# Patient Record
Sex: FEMALE | Race: WHITE | NOT HISPANIC OR LATINO
[De-identification: names, ages, dates, MRNs, and addresses within clinical notes are randomized per-mention and may not be internally consistent; named-entity substitution may affect disease eponyms.]

---

## 2021-02-02 ENCOUNTER — APPOINTMENT (OUTPATIENT)
Dept: HUMAN REPRODUCTION | Facility: CLINIC | Age: 36
End: 2021-02-02
Payer: COMMERCIAL

## 2021-02-02 PROCEDURE — 99204 OFFICE O/P NEW MOD 45 MIN: CPT | Mod: 95

## 2021-02-22 ENCOUNTER — TRANSCRIPTION ENCOUNTER (OUTPATIENT)
Age: 36
End: 2021-02-22

## 2021-02-23 PROBLEM — Z00.00 ENCOUNTER FOR PREVENTIVE HEALTH EXAMINATION: Status: ACTIVE | Noted: 2021-02-23

## 2021-02-25 ENCOUNTER — APPOINTMENT (OUTPATIENT)
Dept: HUMAN REPRODUCTION | Facility: CLINIC | Age: 36
End: 2021-02-25

## 2021-02-26 ENCOUNTER — APPOINTMENT (OUTPATIENT)
Dept: HUMAN REPRODUCTION | Facility: CLINIC | Age: 36
End: 2021-02-26
Payer: COMMERCIAL

## 2021-02-26 PROCEDURE — 36415 COLL VENOUS BLD VENIPUNCTURE: CPT

## 2021-02-26 PROCEDURE — 99072 ADDL SUPL MATRL&STAF TM PHE: CPT

## 2021-03-31 ENCOUNTER — APPOINTMENT (OUTPATIENT)
Dept: HUMAN REPRODUCTION | Facility: CLINIC | Age: 36
End: 2021-03-31
Payer: COMMERCIAL

## 2021-03-31 PROCEDURE — 99213 OFFICE O/P EST LOW 20 MIN: CPT | Mod: 25

## 2021-03-31 PROCEDURE — 99072 ADDL SUPL MATRL&STAF TM PHE: CPT

## 2021-03-31 PROCEDURE — 76830 TRANSVAGINAL US NON-OB: CPT

## 2021-04-12 ENCOUNTER — APPOINTMENT (OUTPATIENT)
Dept: HUMAN REPRODUCTION | Facility: CLINIC | Age: 36
End: 2021-04-12
Payer: COMMERCIAL

## 2021-04-12 PROCEDURE — 36415 COLL VENOUS BLD VENIPUNCTURE: CPT

## 2021-04-12 PROCEDURE — 99072 ADDL SUPL MATRL&STAF TM PHE: CPT

## 2021-04-12 PROCEDURE — 82670 ASSAY OF TOTAL ESTRADIOL: CPT

## 2021-04-12 PROCEDURE — 99213 OFFICE O/P EST LOW 20 MIN: CPT | Mod: 25

## 2021-04-12 PROCEDURE — 76830 TRANSVAGINAL US NON-OB: CPT

## 2021-04-12 PROCEDURE — 83002 ASSAY OF GONADOTROPIN (LH): CPT | Mod: QW

## 2021-04-14 ENCOUNTER — APPOINTMENT (OUTPATIENT)
Dept: HUMAN REPRODUCTION | Facility: CLINIC | Age: 36
End: 2021-04-14
Payer: COMMERCIAL

## 2021-04-14 PROCEDURE — 84144 ASSAY OF PROGESTERONE: CPT

## 2021-04-14 PROCEDURE — 82670 ASSAY OF TOTAL ESTRADIOL: CPT

## 2021-04-14 PROCEDURE — 99213 OFFICE O/P EST LOW 20 MIN: CPT | Mod: 25

## 2021-04-14 PROCEDURE — 36415 COLL VENOUS BLD VENIPUNCTURE: CPT

## 2021-04-14 PROCEDURE — 76830 TRANSVAGINAL US NON-OB: CPT

## 2021-04-14 PROCEDURE — 83002 ASSAY OF GONADOTROPIN (LH): CPT | Mod: QW

## 2021-04-14 PROCEDURE — 99072 ADDL SUPL MATRL&STAF TM PHE: CPT

## 2021-04-16 ENCOUNTER — APPOINTMENT (OUTPATIENT)
Dept: HUMAN REPRODUCTION | Facility: CLINIC | Age: 36
End: 2021-04-16
Payer: COMMERCIAL

## 2021-04-16 PROCEDURE — 99213 OFFICE O/P EST LOW 20 MIN: CPT | Mod: 25

## 2021-04-16 PROCEDURE — 99072 ADDL SUPL MATRL&STAF TM PHE: CPT

## 2021-04-16 PROCEDURE — 36415 COLL VENOUS BLD VENIPUNCTURE: CPT

## 2021-04-16 PROCEDURE — 84144 ASSAY OF PROGESTERONE: CPT

## 2021-04-16 PROCEDURE — 76830 TRANSVAGINAL US NON-OB: CPT

## 2021-05-06 ENCOUNTER — APPOINTMENT (OUTPATIENT)
Dept: HUMAN REPRODUCTION | Facility: CLINIC | Age: 36
End: 2021-05-06
Payer: COMMERCIAL

## 2021-05-06 PROCEDURE — 36415 COLL VENOUS BLD VENIPUNCTURE: CPT

## 2021-05-06 PROCEDURE — 84702 CHORIONIC GONADOTROPIN TEST: CPT

## 2021-05-06 PROCEDURE — 84144 ASSAY OF PROGESTERONE: CPT

## 2021-05-06 PROCEDURE — 99072 ADDL SUPL MATRL&STAF TM PHE: CPT

## 2021-05-10 ENCOUNTER — APPOINTMENT (OUTPATIENT)
Dept: HUMAN REPRODUCTION | Facility: CLINIC | Age: 36
End: 2021-05-10
Payer: COMMERCIAL

## 2021-05-10 PROCEDURE — 84702 CHORIONIC GONADOTROPIN TEST: CPT

## 2021-05-10 PROCEDURE — 84144 ASSAY OF PROGESTERONE: CPT

## 2021-05-10 PROCEDURE — 36415 COLL VENOUS BLD VENIPUNCTURE: CPT

## 2021-05-10 PROCEDURE — 99072 ADDL SUPL MATRL&STAF TM PHE: CPT

## 2021-05-13 ENCOUNTER — APPOINTMENT (OUTPATIENT)
Dept: HUMAN REPRODUCTION | Facility: CLINIC | Age: 36
End: 2021-05-13
Payer: COMMERCIAL

## 2021-05-13 PROCEDURE — 99072 ADDL SUPL MATRL&STAF TM PHE: CPT

## 2021-05-13 PROCEDURE — 99212 OFFICE O/P EST SF 10 MIN: CPT | Mod: 25

## 2021-05-13 PROCEDURE — 76817 TRANSVAGINAL US OBSTETRIC: CPT

## 2021-05-19 ENCOUNTER — APPOINTMENT (OUTPATIENT)
Dept: MATERNAL FETAL MEDICINE | Facility: CLINIC | Age: 36
End: 2021-05-19
Payer: COMMERCIAL

## 2021-05-19 VITALS — BODY MASS INDEX: 18.61 KG/M2 | WEIGHT: 105 LBS | HEIGHT: 63 IN

## 2021-05-19 DIAGNOSIS — Z80.3 FAMILY HISTORY OF MALIGNANT NEOPLASM OF BREAST: ICD-10-CM

## 2021-05-19 DIAGNOSIS — Z78.9 OTHER SPECIFIED HEALTH STATUS: ICD-10-CM

## 2021-05-19 DIAGNOSIS — Z87.440 PERSONAL HISTORY OF URINARY (TRACT) INFECTIONS: ICD-10-CM

## 2021-05-19 DIAGNOSIS — F32.9 OTHER MENTAL DISORDERS COMPLICATING PREGNANCY, FIRST TRIMESTER: ICD-10-CM

## 2021-05-19 DIAGNOSIS — O99.341 OTHER MENTAL DISORDERS COMPLICATING PREGNANCY, FIRST TRIMESTER: ICD-10-CM

## 2021-05-19 DIAGNOSIS — Z15.89 GENETIC SUSCEPTIBILITY TO OTHER DISEASE: ICD-10-CM

## 2021-05-19 DIAGNOSIS — Z82.49 FAMILY HISTORY OF ISCHEMIC HEART DISEASE AND OTHER DISEASES OF THE CIRCULATORY SYSTEM: ICD-10-CM

## 2021-05-19 DIAGNOSIS — Z87.39 PERSONAL HISTORY OF OTHER DISEASES OF THE MUSCULOSKELETAL SYSTEM AND CONNECTIVE TISSUE: ICD-10-CM

## 2021-05-19 DIAGNOSIS — O21.9 VOMITING OF PREGNANCY, UNSPECIFIED: ICD-10-CM

## 2021-05-19 DIAGNOSIS — Z80.41 FAMILY HISTORY OF MALIGNANT NEOPLASM OF OVARY: ICD-10-CM

## 2021-05-19 DIAGNOSIS — Z13.71 ENCOUNTER FOR NONPROCREATIVE SCREENING FOR GENETIC DISEASE CARRIER STATUS: ICD-10-CM

## 2021-05-19 DIAGNOSIS — Z23 ENCOUNTER FOR IMMUNIZATION: ICD-10-CM

## 2021-05-19 DIAGNOSIS — R79.89 OTHER SPECIFIED ABNORMAL FINDINGS OF BLOOD CHEMISTRY: ICD-10-CM

## 2021-05-19 DIAGNOSIS — R61 GENERALIZED HYPERHIDROSIS: ICD-10-CM

## 2021-05-19 DIAGNOSIS — D47.1 CHRONIC MYELOPROLIFERATIVE DISEASE: ICD-10-CM

## 2021-05-19 DIAGNOSIS — Z86.000 PERSONAL HISTORY OF IN-SITU NEOPLASM OF BREAST: ICD-10-CM

## 2021-05-19 PROCEDURE — 99205 OFFICE O/P NEW HI 60 MIN: CPT | Mod: 95

## 2021-05-19 RX ORDER — CHLORPHENIRAMINE MALEATE 4 MG/1
250-125 TABLET ORAL
Refills: 0 | Status: ACTIVE | COMMUNITY

## 2021-05-19 RX ORDER — CITALOPRAM 10 MG/1
TABLET, FILM COATED ORAL
Refills: 0 | Status: ACTIVE | COMMUNITY

## 2021-05-19 RX ORDER — GLUC/MSM/COLGN2/HYAL/ANTIARTH3 375-375-20
TABLET ORAL
Refills: 0 | Status: ACTIVE | COMMUNITY

## 2021-05-19 RX ORDER — ASPIRIN 81 MG
81 TABLET, DELAYED RELEASE (ENTERIC COATED) ORAL
Refills: 0 | Status: ACTIVE | COMMUNITY

## 2021-05-19 RX ORDER — CHROMIUM 200 MCG
TABLET ORAL
Refills: 0 | Status: ACTIVE | COMMUNITY

## 2021-05-19 RX ORDER — FOLIC ACID 0.8 MG
500 TABLET ORAL
Refills: 0 | Status: ACTIVE | COMMUNITY

## 2021-05-25 ENCOUNTER — APPOINTMENT (OUTPATIENT)
Dept: HUMAN REPRODUCTION | Facility: CLINIC | Age: 36
End: 2021-05-25
Payer: COMMERCIAL

## 2021-05-25 PROCEDURE — 76817 TRANSVAGINAL US OBSTETRIC: CPT

## 2021-05-25 PROCEDURE — 99213 OFFICE O/P EST LOW 20 MIN: CPT | Mod: 25

## 2021-05-25 PROCEDURE — 99072 ADDL SUPL MATRL&STAF TM PHE: CPT

## 2021-06-05 PROBLEM — O21.9 NAUSEA AND VOMITING DURING PREGNANCY: Status: ACTIVE | Noted: 2021-06-05

## 2021-06-05 RX ORDER — DOXYLAMINE SUCCINATE AND PYRIDOXINE HYDROCHLORIDE 10; 10 MG/1; MG/1
10-10 TABLET, DELAYED RELEASE ORAL 3 TIMES DAILY
Qty: 90 | Refills: 2 | Status: ACTIVE | COMMUNITY
Start: 2021-06-05 | End: 1900-01-01

## 2021-06-05 RX ORDER — ONDANSETRON 8 MG/1
8 TABLET, ORALLY DISINTEGRATING ORAL EVERY 8 HOURS
Qty: 30 | Refills: 5 | Status: ACTIVE | COMMUNITY
Start: 2021-06-05 | End: 1900-01-01

## 2021-06-05 RX ORDER — METOCLOPRAMIDE 5 MG/1
5 TABLET ORAL 3 TIMES DAILY
Qty: 30 | Refills: 5 | Status: ACTIVE | COMMUNITY
Start: 2021-06-05 | End: 1900-01-01

## 2021-06-13 RX ORDER — PYRIDOXINE HCL (VITAMIN B6) 50 MG
50 TABLET ORAL
Qty: 60 | Refills: 3 | Status: ACTIVE | COMMUNITY
Start: 2021-06-13 | End: 1900-01-01

## 2021-06-13 RX ORDER — TRIMETHOBENZAMIDE HYDROCHLORIDE 300 MG/1
300 CAPSULE ORAL
Qty: 90 | Refills: 0 | Status: ACTIVE | COMMUNITY
Start: 2021-06-13 | End: 1900-01-01

## 2021-06-13 RX ORDER — DOXYLAMINE SUCCINATE 25 MG
25 TABLET ORAL
Qty: 60 | Refills: 2 | Status: ACTIVE | COMMUNITY
Start: 2021-06-13 | End: 1900-01-01

## 2021-06-21 RX ORDER — FAMOTIDINE 20 MG/1
20 TABLET, FILM COATED ORAL DAILY
Qty: 30 | Refills: 3 | Status: ACTIVE | COMMUNITY
Start: 2021-06-21 | End: 1900-01-01

## 2021-06-21 RX ORDER — PROMETHAZINE HYDROCHLORIDE 12.5 MG/1
12.5 SUPPOSITORY RECTAL AT BEDTIME
Qty: 30 | Refills: 0 | Status: ACTIVE | COMMUNITY
Start: 2021-06-21 | End: 1900-01-01

## 2021-06-30 PROBLEM — O99.341 DEPRESSION DURING PREGNANCY IN FIRST TRIMESTER: Status: ACTIVE | Noted: 2021-05-19

## 2021-06-30 PROBLEM — D47.1 MYELOPROLIFERATIVE NEOPLASM: Status: ACTIVE | Noted: 2021-06-30

## 2021-06-30 PROBLEM — Z86.000 HISTORY OF DUCTAL CARCINOMA IN SITU (DCIS) OF RIGHT BREAST: Status: RESOLVED | Noted: 2021-05-19 | Resolved: 2021-06-30

## 2021-07-01 DIAGNOSIS — O21.0 MILD HYPEREMESIS GRAVIDARUM: ICD-10-CM

## 2021-07-01 DIAGNOSIS — O21.1 HYPEREMESIS GRAVIDARUM WITH METABOLIC DISTURBANCE: ICD-10-CM

## 2021-07-01 RX ORDER — PROMETHAZINE HYDROCHLORIDE 25 MG/1
25 SUPPOSITORY RECTAL
Qty: 1 | Refills: 5 | Status: ACTIVE | COMMUNITY
Start: 2021-07-01 | End: 1900-01-01

## 2021-07-01 RX ORDER — PROMETHAZINE HYDROCHLORIDE 12.5 MG/1
12.5 SUPPOSITORY RECTAL EVERY 8 HOURS
Qty: 1 | Refills: 5 | Status: ACTIVE | COMMUNITY
Start: 2021-07-01 | End: 1900-01-01

## 2021-07-01 NOTE — REVIEW OF SYSTEMS
[Anxiety] : anxiety [Depression] : depression [Recent Wt Gain ___ Lbs] : no recent weight gain [Chest Pain] : no chest pain [Dyspnea] : no shortness of breath [SOB on Exertion] : no shortness of breath during exertion [Vomiting] : no vomiting [Constipation] : no constipation [Heartburn] : no heartburn [Abn Vag Bleeding] : no abnormal vaginal bleeding [Breast Pain] : no breast pain [Breast Lump] : no breast lump [Sleep Disturbances] : no sleep disturbances [Easy Bleeding] : does not bleed easily [Easy Bruising] : does not bruise easily

## 2021-07-01 NOTE — HISTORY OF PRESENT ILLNESS
[FreeTextEntry1] : Thank you for referring Ms. Flower Amanda for Maternal Fetal Medicine pregnancy consultation. She is a 35 year old G1 at 6 weeks gestational age with a history of breast cancer and a myeloproliferative disorder. \par \par Flower has a history of ER/AK positive DCIS diagnosed in 2016 and treated with 3 lumpectomies (all in 2016) and radiation for 6 weeks. She will not be treated with tamoxifen until childbearing is complete. She was not treated with any chemotherapy. She has mammogram and sonogram every 6 months for surveillance. Last mammogram was November 2020; she reports normal results. She follows with her Oncology team at [facility]; records are unavailable for review. 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 She denies a personal or family history of blood clotting disorders or venous thromboembolic events. Records from her Hematologist are unavailable for review. \par \par Her 's family history is negative for birth defects and genetic disorders to her knowledge. \par \[facility] \[facility] Dr. Abi Cespedes - Oncologist [facility] Dr. Juan Antonio Harding - Breast Surgeon [facility] \[facility] Dr. Sanderson - Hematologist [facility] \[facility] Dr. Ajith Guthrie - Hematologist [facility] Dr. Lamin Arreaga and Dr. Rossi - Psychiatrists\[facility] Dr. Alta Kinney - GYN [facility] \par  [Patient travelled to an area with active Zika Virus transmission during pregnancy or 8 weeks before getting pregnant] : Patient stated she did not travel to an area with active Zika virus transmission during pregnancy or 8 weeks before getting pregnant [Patient had sex without a condom with a man who traveled to, or reside in, an area with active Zika Virus transmission during pregnancy] : Patient did not have sex without a condom with a man who traveled to, or reside in, an area with active Zika Virus transmission during pregnancy

## 2021-07-01 NOTE — DISCUSSION/SUMMARY
[FreeTextEntry1] : Her problems and my suggestions for her management are summarized below.  She was extensively counseled regarding the following issues:\par \par \par \par History of breast cancer:\par \par Flower has been in remission for 5 years and continues to follow closely with her Oncologists at [facility] for breast cancer surveillance. I reassured her that prior breast cancer treatments do not appear to increase the risk of congenital malformations or adverse pregnancy outcomes. Also, a subsequent pregnancy does not appear to compromise survival in women with a history of breast cancer.  \par \par If CT imaging or mammography is indicated for any reason in light of her history of cancer, it should be obtained regardless of pregnancy status. The risk of fetal exposure to radiation is minimal with CT scan of the head or chest and pregnancy should not delay necessary treatment. MRI can be used any time in pregnancy; contrast should be administered only if benefit outweighs the potential fetal risk. \par \par \par \par Myeloproliferative disorders (JAK2 mutation) in pregnancy:\par \par Risk of pregnancy-associated complications is higher in patients with myeloproliferative disorders. There is an increased risk of first-trimester pregnancy loss and fetal growth restriction. Maternal complications include an increased risk of both venous and arterial thrombotic events; however, the benefit of systemic anticoagulation therapy in the absence of a history of thrombosis is unclear. Systemic anticoagulation with enoxaparin is generally reserved for patients with history of venous thrombosis. I recommended low-dose aspirin daily as this has been shown to decrease the risk of first trimester pregnancy loss in this population in small retrospective reviews. \par \par Generally, platelet counts will decrease in pregnancy and increase again in the postpartum period. The risk of VTE is highest up to 6?weeks postpartum. Close surveillance is advised.\par \par \par \par Hyperemesis gravidarum:\par \par Flower reports significant nausea and vomiting. I reviewed dietary changes that may help ease her symptoms and prescriptions for antiemetics were sent to her pharmacy. \par \par \par \par I explained to Flower that monitoring of her pregnancy with MFM along with OB is suggested, and I offered her to continue care at [facility] or with her team at [facility]. Because all of her care for her medical conditions and her medical team is within the [facility] system, she was most comfortable establishing care with the OB department at [facility]. \par \par A referral request was sent to Dr. Bailee Velazquez ([facility]) and Flower has established care with their practice. \par \par She has my information and understands that I am available for any additional questions or concerns. \par \par \par This consultation was performed via telehealth using the [employer] video chat with real-time 2-way audio visual technology. Ms. Amadna provided verbal consent to use the application at the time of consultation. She was physically present in her home and I was physically present off site. No other people were present for or participated in the consultation. At the end of our visit, the patient indicated that her questions were answered, and she seemed satisfied with our discussion. 65 minutes were spent with the patient on video chat with an additional 15 minutes spent for review of records, documentation, and coordination of care.  Please do not hesitate to contact me with any questions.\par \par

## 2021-07-01 NOTE — DATA REVIEWED
[FreeTextEntry1] : 2/26/2021 labs:\par WBC 3.92\par HgB 12.5\par Platelet count 445\par Hemoglobin electrophoresis normal pattern\par Hemoglobin A1C 4.8%\par TSH 1.09\par Vitamin D 61.4\par HBsAg NR\par Hepatitis C Ab NR\par Rubella and varicella immune\par Syphilis screen negative \par O pos, antibody screen negative\par HIV NR\par

## 2021-07-01 NOTE — OB HISTORY
[___] : Total Pregnancies: [unfilled] [LMP: ___] : LMP: [unfilled] [CHRISTOPHER: ___] : CHRISTOPHER: [unfilled] [EGA: ___ wks] : EGA: [unfilled] wks [Reproductive Assisted] : Reproductive Assisted conception [FreeTextEntry1] : Pregnancy conceived with letrozole.
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
## 2021-07-01 NOTE — CHIEF COMPLAINT
[G ___] : G [unfilled] [P ___] : P [unfilled] [de-identified] : history of breast cancer and blood clotting disorder

## 2021-07-01 NOTE — SURGICAL HISTORY
[Abn Paps] : abnormal pap smear [Breast Disease] : breast disease [Last Pap: ___] : Last Pap: [unfilled] [Last Mammo: ___] : Last Mammo: [unfilled] [Fibroids] : no fibroids [STI's] : no STI's [Infertility] : no infertility [Cysts] : no cysts [OC Use] : no OC use [de-identified] : Abnormal pap in 2019 with HPV (not 16 or 18), cytology normal.